# Patient Record
Sex: FEMALE | Race: WHITE | NOT HISPANIC OR LATINO | URBAN - METROPOLITAN AREA
[De-identification: names, ages, dates, MRNs, and addresses within clinical notes are randomized per-mention and may not be internally consistent; named-entity substitution may affect disease eponyms.]

---

## 2018-07-04 ENCOUNTER — EMERGENCY (EMERGENCY)
Facility: HOSPITAL | Age: 33
LOS: 1 days | Discharge: HOME | End: 2018-07-04
Attending: EMERGENCY MEDICINE
Payer: COMMERCIAL

## 2018-07-04 VITALS
TEMPERATURE: 98 F | WEIGHT: 145.06 LBS | SYSTOLIC BLOOD PRESSURE: 163 MMHG | DIASTOLIC BLOOD PRESSURE: 69 MMHG | RESPIRATION RATE: 14 BRPM | HEART RATE: 114 BPM | HEIGHT: 68 IN | OXYGEN SATURATION: 98 %

## 2018-07-04 PROCEDURE — 12002 RPR S/N/AX/GEN/TRNK2.6-7.5CM: CPT

## 2018-07-04 PROCEDURE — 99283 EMERGENCY DEPT VISIT LOW MDM: CPT | Mod: 25

## 2018-07-04 RX ORDER — NORETHINDRONE AND ETHINYL ESTRADIOL 0.4-0.035
1 KIT ORAL
Qty: 0 | Refills: 0 | COMMUNITY

## 2018-07-04 NOTE — ED PROVIDER NOTE - ENMT, MLM
Airway patent, Nasal mucosa clear. Mouth with normal mucosa. Throat has no vesicles, no oropharyngeal exudates and uvula is midline.  upper occiput with deep, inverted v shaped laceration approximately 5cm.

## 2018-07-04 NOTE — ED PROVIDER NOTE - MEDICAL DECISION MAKING DETAILS
pt sp fall with head injury scalp lac, no indication for ct, lives with fiancee, staples, tdap, check c spine films pt sp fall with head injury scalp lac, no indication for ct, lives with fiancee, staples, tdap, check c spine films--pt refusing c spine xray.  will d/c ama.  d/c sutures in 7 days

## 2018-07-04 NOTE — ED PROVIDER NOTE - OBJECTIVE STATEMENT
pt is a 34 yo female ? last tdap. pt had several glasses of wine and was sitting on edge of hot tub and fell backward and hit back of head on either plastic steps or pavers. no loc, neck pain, n/v. numbness or weakness. pt with + bleeding and laceration from back of head

## 2018-07-04 NOTE — ED ADULT NURSE NOTE - OBJECTIVE STATEMENT
33 year old female presents to the ED with c/o head injury. A+O x 4. Family at the bedside. State she fell out of a hot tub and hit her head. Denies syncope/ LOC. States she ingested "a few drinks for the holiday." PERRLA. Gait steady. Pt alert, cooperative. Triangular laceration noted to the upper right occipital head. Moderate bleeding noted from wound. Wound soaked with NS. Denies pain at this time. will continue to monitor.

## 2018-07-04 NOTE — ED PROVIDER NOTE - CHPI ED SYMPTOMS NEG
no loss of consciousness/no vomiting/no change in level of consciousness/no weakness/no nausea/no syncope/no seizure/no dizziness/no blurred vision/no confusion

## 2018-07-04 NOTE — ED PROVIDER NOTE - REFUSAL OF SERVICE, MDM
r and b discussed with pt about c spine clearance without xrays after drinking etoh. pt understands and refusing xrays

## 2018-07-05 PROCEDURE — 99283 EMERGENCY DEPT VISIT LOW MDM: CPT | Mod: 25

## 2018-07-05 PROCEDURE — 90471 IMMUNIZATION ADMIN: CPT

## 2018-07-05 PROCEDURE — 12002 RPR S/N/AX/GEN/TRNK2.6-7.5CM: CPT

## 2018-07-05 PROCEDURE — 90715 TDAP VACCINE 7 YRS/> IM: CPT

## 2018-07-05 RX ORDER — TETANUS TOXOID, REDUCED DIPHTHERIA TOXOID AND ACELLULAR PERTUSSIS VACCINE, ADSORBED 5; 2.5; 8; 8; 2.5 [IU]/.5ML; [IU]/.5ML; UG/.5ML; UG/.5ML; UG/.5ML
0.5 SUSPENSION INTRAMUSCULAR ONCE
Qty: 0 | Refills: 0 | Status: COMPLETED | OUTPATIENT
Start: 2018-07-05 | End: 2018-07-05

## 2018-07-05 RX ADMIN — TETANUS TOXOID, REDUCED DIPHTHERIA TOXOID AND ACELLULAR PERTUSSIS VACCINE, ADSORBED 0.5 MILLILITER(S): 5; 2.5; 8; 8; 2.5 SUSPENSION INTRAMUSCULAR at 00:34

## 2018-07-05 NOTE — ED ADULT NURSE REASSESSMENT NOTE - NS ED NURSE REASSESS COMMENT FT1
10 staples applied to upper posterior scalp. Denies pain/ discomfort at this time. Advised by Dr. Contreras of medical need for Head/ Neck CT and Xrays for proper medical clearance. Pt refused both. Dr. Contreras aware and re-explained medical need. Pt aware of leaving AMA. forms to be signed. Wound care education provided and demonstrated. Discharge instructions to be given. Pt remains A+O x 4. Intoxication status still evident, but gait steady. Speech and memory intact.  Alert and awake. Dispo pending . Family remains at the bedside.

## 2019-09-25 NOTE — ED PROVIDER NOTE - NS ED MD DISPO DISCHARGE CCDA
Date of Surgery Update:  Xiomara Nascimento was seen and examined. History and physical has been reviewed. The patient has been examined. There have been no significant clinical changes since the last office visit.       Signed By: Allie Arshad MD     September 25, 2019 1:25 PM Patient/Caregiver provided printed discharge information.

## 2022-01-12 NOTE — ED ADULT NURSE NOTE - TEMPLATE LIST FOR HEAD TO TOE ASSESSMENT
Intake Diagnosis & Plan    Name of Physician Contacted: Andrew    Disposition Level of Care: Community Resources    Comments: Pt requested to leave AMA. MD was notified. Pt will be discharged with community resources for the [facility]. ED staff agree with discharge plan.    Reasons for Level of Care    Reason(s) for Outpatient or Intensive Outpatient Treatment: Ineffective coping skills    ICD 10 Diagnosis: Major Depressive Disorder - F32.9    Referral Source Notified: Completed    Intake Assessment Summary : Pt is 26year old female who presents voluntarily for assessment at [facility] ER reporting worsening depression, anxiety, auditory hallucinations with voices saying bad things about her, and suicidal ideation with no current plan. Pt reports history of schizophrenia and has no outpatient mental health providers. Pt reports ongoing stressors including loss of home, lack of social supports, loss of appetite, poor sleep, and feeling hopeless and helpless. 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 Pt states she lives out of her car and has a part-time job. Pt reports she tested positive for COVID-19 on 1/7/22. Shortly after intake assessment, Pt requested to leave AMA. MD was notified. Pt will be discharged with community resources for the [place] area. ED staff agree with discharge plan.      
Wounds